# Patient Record
Sex: MALE | Race: WHITE | Employment: FULL TIME | URBAN - METROPOLITAN AREA
[De-identification: names, ages, dates, MRNs, and addresses within clinical notes are randomized per-mention and may not be internally consistent; named-entity substitution may affect disease eponyms.]

---

## 2017-05-19 ENCOUNTER — HOSPITAL ENCOUNTER (EMERGENCY)
Age: 22
Discharge: HOME | End: 2017-05-19
Attending: EMERGENCY MEDICINE
Payer: OTHER MISCELLANEOUS

## 2017-05-19 VITALS — HEIGHT: 70 IN | BODY MASS INDEX: 28.63 KG/M2 | WEIGHT: 200 LBS

## 2017-05-19 DIAGNOSIS — S61.219A LACERATION OF FINGERS WITHOUT COMPLICATION, INITIAL ENCOUNTER: Primary | ICD-10-CM

## 2017-05-19 PROCEDURE — 75810000293 HC SIMP/SUPERF WND  RPR

## 2017-05-19 PROCEDURE — 77030002986 HC SUT PROL J&J -A

## 2017-05-19 PROCEDURE — 77030018836 HC SOL IRR NACL ICUM -A

## 2017-05-19 PROCEDURE — 99282 EMERGENCY DEPT VISIT SF MDM: CPT

## 2017-05-19 NOTE — DISCHARGE INSTRUCTIONS

## 2017-05-19 NOTE — ED PROVIDER NOTES
HPI Comments: 5:37 PM    Salvatore Lozoya is a 19 y.o. male with no pertinent PMHx, presenting ambulatory to the ED c/o laceration to the palm of his left hand s/p injury. 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 Pt states that his tetanus is UTD as of 4 years ago. Pt denies any concern for foreign bodies. Pt specifically denies any fever/chills or recent illness. PCP: No primary care provider on file. Social Hx: - tobacco use, - alcohol use, - illicit drug use    There are no other complaints, changes, or physical findings at this time. The history is provided by the patient. No  was used. History reviewed. No pertinent past medical history. Past Surgical History:   Procedure Laterality Date    HX APPENDECTOMY           History reviewed. No pertinent family history. Social History     Social History    Marital status: SINGLE     Spouse name: N/A    Number of children: N/A    Years of education: N/A     Occupational History    Not on file. Social History Main Topics    Smoking status: Not on file    Smokeless tobacco: Not on file    Alcohol use Not on file    Drug use: Not on file    Sexual activity: Not on file     Other Topics Concern    Not on file     Social History Narrative    No narrative on file         ALLERGIES: Review of patient's allergies indicates no known allergies. Review of Systems   Constitutional: Negative for chills and fever. Skin: Positive for wound (left hand, laceration). All other systems reviewed and are negative. Vitals:    05/19/17 1735   Weight: 90.7 kg (200 lb)   Height: 5' 10\" (1.778 m)            Physical Exam   Constitutional: He is oriented to person, place, and time. He appears well-developed and well-nourished. No distress. Well appearing NAD, non toxic   HENT:   Head: Normocephalic and atraumatic. Neck: Normal range of motion. Neck supple.    Cardiovascular: Normal rate, regular rhythm, normal heart sounds and intact distal pulses. No murmur heard. Pulmonary/Chest: Effort normal and breath sounds normal. No respiratory distress. He has no wheezes. He has no rales. Musculoskeletal:        Left hand: Normal sensation noted. Normal strength noted. Hands:  Left hand:   Full flexion and extension of the 3rd and 4th fingers, brisk cap refill, N/V intact, radial pulse 2+    Neurological: He is alert and oriented to person, place, and time. Skin:   See musculoskeletal exam    Psychiatric: He has a normal mood and affect. Judgment normal.   Nursing note and vitals reviewed. RESULTS:      No orders to display        Labs Reviewed - No data to display    No results found for this or any previous visit (from the past 12 hour(s)). MDM  Number of Diagnoses or Management Options  Laceration of fingers without complication, initial encounter:   Diagnosis management comments: Laceration in need of repair, no evidence of tendon or nerve,     ED Course   Medications - No data to display    Wound Repair  Date/Time: 5/19/2017 5:46 PM  Performed by: PA (MADHAVI Langley)Preparation: skin prepped with Betadine  Pre-procedure re-eval: Immediately prior to the procedure, the patient was reevaluated and found suitable for the planned procedure and any planned medications. Location details: left long finger and left ring finger  Wound length:2.5 cm or less  Anesthesia: local infiltration    Anesthesia:  Anesthesia: local infiltration  Local Anesthetic: lidocaine 1% without epinephrine   Anesthetic total: 5 (in ccs) mL  Foreign bodies: no foreign bodies  Irrigation solution: saline  Debridement: none  Skin closure: 5-0 nylon  Number of sutures: 4  Technique: simple and interrupted  Approximation: close  Dressing: 4x4  Patient tolerance: Patient tolerated the procedure well with no immediate complications  My total time at bedside, performing this procedure was 1-15 minutes.       DISCHARGE NOTE:  6:10 PM  Jonathan Collado  results have been reviewed with him. He has been counseled regarding his diagnosis, treatment, and plan. He verbally conveys understanding and agreement of the signs, symptoms, diagnosis, treatment and prognosis and additionally agrees to follow up as discussed. He also agrees with the care-plan and conveys that all of his questions have been answered. I have also provided discharge instructions for him that include: educational information regarding their diagnosis and treatment, and list of reasons why they would want to return to the ED prior to their follow-up appointment, should his condition change. CLINICAL IMPRESSION:    1. Laceration of fingers without complication, initial encounter        PLAN:  1. D/C Home  2. There are no discharge medications for this patient. 3.   Follow-up Information     Follow up With Details Comments Contact Info    [facility] Schedule an appointment as soon as possible for a visit in 3 days for PCP follow up, at the [facility] [address] [facility], [address] [facility]    [facility]  As needed, If symptoms worsen [address]  [address] [address]  [phone]         Attestation: This note is prepared by Ly Abdi. Crescencio Hussein, acting as Scribe for Zaki Smith. Richie Barnes PA-C: The scribe's documentation has been prepared under my direction and personally reviewed by me in its entirety. I confirm that the note above accurately reflects all work, treatment, procedures, and medical decision making performed by me.
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
